# Patient Record
Sex: MALE | Race: WHITE | Employment: STUDENT | URBAN - METROPOLITAN AREA
[De-identification: names, ages, dates, MRNs, and addresses within clinical notes are randomized per-mention and may not be internally consistent; named-entity substitution may affect disease eponyms.]

---

## 2020-01-07 ENCOUNTER — OFFICE VISIT (OUTPATIENT)
Dept: NEUROLOGY | Age: 20
End: 2020-01-07

## 2020-01-07 VITALS
BODY MASS INDEX: 22.86 KG/M2 | HEART RATE: 87 BPM | RESPIRATION RATE: 20 BRPM | HEIGHT: 72 IN | OXYGEN SATURATION: 98 % | DIASTOLIC BLOOD PRESSURE: 88 MMHG | SYSTOLIC BLOOD PRESSURE: 138 MMHG | WEIGHT: 168.8 LBS

## 2020-01-07 DIAGNOSIS — R53.83 FATIGUE, UNSPECIFIED TYPE: ICD-10-CM

## 2020-01-07 DIAGNOSIS — R41.3 MEMORY LOSS: Primary | ICD-10-CM

## 2020-01-07 DIAGNOSIS — G47.12 IDIOPATHIC HYPERSOMNIA WITHOUT LONG SLEEP TIME: ICD-10-CM

## 2020-01-07 DIAGNOSIS — G44.229 CHRONIC TENSION-TYPE HEADACHE, NOT INTRACTABLE: ICD-10-CM

## 2020-01-07 DIAGNOSIS — M54.2 CERVICALGIA: ICD-10-CM

## 2020-01-07 RX ORDER — AMITRIPTYLINE HYDROCHLORIDE 10 MG/1
TABLET, FILM COATED ORAL
Qty: 90 TAB | Refills: 2 | Status: SHIPPED | OUTPATIENT
Start: 2020-01-07 | End: 2020-01-30

## 2020-01-07 NOTE — PROGRESS NOTES
NEUROLOGY CLINIC NOTE    Patient ID:  Samantha Graff  <[id number]>  18 y.o.  2000    Date of Consultation:  January 7, 2020    Referring Physician: Dr. Olson    Reason for Consultation:  Chronic fatigue and cognitive issues    Chief Complaint   Patient presents with    New Patient     chronic fatigue, memory issues, trouble thinking clearly        History of Present Illness: There are no active problems to display for this patient. No past medical history on file. No past surgical history on file. Prior to Admission medications    Not on File     NKDA    Social History     Tobacco Use    Smoking status: Never Smoker    Smokeless tobacco: Never Used   Substance Use Topics    Alcohol use: Not Currently    Drug use: Never     Family History   Problem Relation Age of Onset    Cancer Mother     Anxiety Mother     Depression Mother     Hypertension Father          Subjective:      Samantha Graff is a 23 y.o. RHWM with no significant medical history who was referred here by Dr. Olson for further evaluation of his issues. Condition has been ongoing for 1 year. Worse cognitively. Mainly issues with short term memory. Meeting people and the forgetting their names. Problems focusing. No issues with activities of daily living. Zoning out. Good and bad days. Problem with starting to sleep. Averages 8 to 9 hours. Wakes up not feeling rested. Eyes feels tired. No vivid dreams. Admits to daytime somnolence. Feels fatigued all the time. No weakness or loss of muscle bulk. Does not work out. Use to play baseball in high school. None since in college. Complains of neck tension and headaches. 4/10. Constant tension pain. Left occiput radiates up front. Daily. Currently sees a chiropractor for the past week. Occasional Advil offers no benefit. Worse with activity. Some better with rest.    He saw Dr. Joshua Bell 3 weeks PTC and was referred here.      Obtain records from PCP: Laboratory work-up done 9/27/2019 revealed normal vitamin D and negative chlamydia and Neisseria. Negative urinalysis. Laboratory work-up done 6/8/2019 were unremarkable (NILA, rheumatoid arthritis factor, vitamin D, B12, CRP, lipid panel, CMP, hemoglobin A1c and TSH). Outside reports reviewed: office notes, lab reports. Review of Systems:    A comprehensive review of systems was performed:   Constitutional: positive for fatigue  Eyes: positive for visual disturbance   Ears, nose, mouth, throat, and face: positive for hearing loss, ringing in the ears   Respiratory: positive for shortness of breath   Cardiovascular: positive for none  Gastrointestinal: positive for nausea   Genitourinary: positive for none  Integument/breast: positive for none  Hematologic/lymphatic: positive for none  Musculoskeletal: positive for neck pain, weakness  Neurological: positive for headaches, memory loss, dizziness  Behavioral/Psych: positive for none  Endocrine: positive for none  Allergic/Immunologic: positive for none      Objective:     Visit Vitals  Resp 20   Ht 6' (1.829 m)   Wt 76.6 kg (168 lb 12.8 oz)   BMI 22.89 kg/m²       PHYSICAL EXAM:    General Appearance: Alert, patient appears stated age. General:  Well developed, well nourished, patient in no apparent distress. Head/Face: The head is normocephalic and atraumatic. Eyes: Conjunctivae appear normal. Sclera appear normal and non-icteric. Nose (and Sinus):   No abnormality of the nose or sinuses is noted. Oral:   Throat is clear. Lymphatics:  No lymphadenopathy in the neck/head. Neck and Thyroid:   No bruits noted in the neck. Respiratory:  Lungs clear to auscultation. Cardiovascular:  Palpation and auscultation: regular rate and rhythm. Extremity: No joint swelling, erythema or pedal edema. NEUROLOGICAL EXAM:    Appearance: The patient is well developed, well nourished, provides a coherent history and is in no acute distress.    Mental Status: Oriented to time, place and person. Fluent, no aphasia or dysarthria. Mood and affect appropriate. MMSE 28/30 (problem with exact date, 1/3 on immediate recall) but improved with prodding. Cranial Nerves:   Intact visual fields. VA 20/20 OU on near vision without correction. Fundi are benign. CALEB, EOM's full, no nystagmus, no ptosis. Facial sensation is normal. Corneal reflexes are intact. Facial movement is symmetric. Hearing is normal bilaterally. Palate is midline with normal elevation. Sternocleidomastoid and trapezius muscles are normal. Tongue is midline. Motor:  5/5 strength in upper and lower proximal and distal muscles. Normal bulk and tone. No fasciculations. No pronator drift. Reflexes:   Deep tendon reflexes 2+/4 and symmetrical. Downgoing toes. Sensory:   Normal to cold, pinprick and vibration. Gait:  Normal gait. No Romberg. Can do tandem walking. Tremor:   No tremor noted. Cerebellar:  Intact FTN/ERIKA/HTS. Neurovascular:  Normal heart sounds and regular rhythm, peripheral pulses intact, and no carotid bruits. Anterior head posture. 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  No indication currently to do any neuroimaging. Cognitive testing was done and patient scored 28/30. Problems with exact date and immediate recall. However with prodding it did show improvement. No indication on bedside testing of any serious issues with progressive dementia. Suspect issue with cognition likely a symptom of poor sleep and headaches. Advised to do reminder systems. Advised to do brain age games. Monitor for now. If condition progresses then formal neuropsychological testing will be done. Negative laboratory work-up for reversible causes of memory issues. Patient having issues with sleeping.   Wakes up not feeling rested and has daytime somnolence. Trial of improving his sleep patterns and see if that improves his overall condition as well. Condition persist then patient will be referred to sleep specialist for polysomnogram.    Exam and history reveals elements of tension headaches due to poor anterior head posture. Advised to correct his head posture. Okay to take OTC medication for relief. Trial of amitriptyline 10 mg at bedtime initially and up to 30 mg at bedtime if necessary. Prescriptions provided. Patient with anterior head posture that predisposes to cervicogenic headaches as well as occipital neuralgia. Advised to correct his anterior head posture. Use headrest at home, while driving and at school. Use wireless headsets. Do not carry anything on the shoulder. Sleep with only one pillow at most.  Given brochure for neck and shoulder exercises to do. Continue chiropractic manipulation. May benefit from physical therapy with dry needling if condition worsens. Potential occipital nerve blocks. Exam reveals mild fatigable ptosis after 2-minute extreme upgaze. No limb fatigability. Given issues with chronic fatigue and overall sense of unwellness, need to check for neuromuscular junction disorder. Acetylcholine receptor antibody testing was ordered. Further intervention be done pending results of testing. All questions and concerns were answered. This note was created using voice recognition software. Despite editing, there may be syntax errors.

## 2020-01-07 NOTE — PROGRESS NOTES
Fatigue- over a year mentally tired and constantly zoning out, no clear train of thought, has gotten worse over the course of the year     Brain is very tired, takes a lot of energy to get anything done, forgets the simplest things     Falling asleep but not getting a good nights sleep, sometimes he is awake all night just not able to go back to sleep     Very tired during the day   Having pains coming from the left side of his head/neck and wraps to the front of his head     Did blood work with his PCP and everything came back normal

## 2020-01-07 NOTE — PATIENT INSTRUCTIONS
Neck: Exercises  Introduction  Here are some examples of exercises for you to try. The exercises may be suggested for a condition or for rehabilitation. Start each exercise slowly. Ease off the exercises if you start to have pain. You will be told when to start these exercises and which ones will work best for you. How to do the exercises  Neck stretch    1. This stretch works best if you keep your shoulder down as you lean away from it. To help you remember to do this, start by relaxing your shoulders and lightly holding on to your thighs or your chair. 2. Tilt your head toward your shoulder and hold for 15 to 30 seconds. Let the weight of your head stretch your muscles. 3. If you would like a little added stretch, use your hand to gently and steadily pull your head toward your shoulder. For example, keeping your right shoulder down, lean your head to the left. 4. Repeat 2 to 4 times toward each shoulder. Diagonal neck stretch    1. Turn your head slightly toward the direction you will be stretching, and tilt your head diagonally toward your chest and hold for 15 to 30 seconds. 2. If you would like a little added stretch, use your hand to gently and steadily pull your head forward on the diagonal.  3. Repeat 2 to 4 times toward each side. Dorsal glide stretch    1. Sit or stand tall and look straight ahead. 2. Slowly tuck your chin as you glide your head backward over your body  3. Hold for a count of 6, and then relax for up to 10 seconds. 4. Repeat 8 to 12 times. Chest and shoulder stretch    1. Sit or stand tall and glide your head backward as in the dorsal glide stretch. 2. Raise both arms so that your hands are next to your ears. 3. Take a deep breath, and as you breathe out, lower your elbows down and behind your back. You will feel your shoulder blades slide down and together, and at the same time you will feel a stretch across your chest and the front of your shoulders.   4. Hold for about 6 seconds, and then relax for up to 10 seconds. 5. Repeat 8 to 12 times. Strengthening: Hands on head    1. Move your head backward, forward, and side to side against gentle pressure from your hands, holding each position for about 6 seconds. 2. Repeat 8 to 12 times. Follow-up care is a key part of your treatment and safety. Be sure to make and go to all appointments, and call your doctor if you are having problems. It's also a good idea to know your test results and keep a list of the medicines you take. Where can you learn more? Go to http://fabricio-myrtle.info/. Enter P975 in the search box to learn more about \"Neck: Exercises. \"  Current as of: June 26, 2019  Content Version: 12.2  © 4043-0793 [employer]. Care instructions adapted under license by [employer] (which disclaims liability or warranty for this information). If you have questions about a medical condition or this instruction, always ask your healthcare professional. [address] any warranty or liability for your use of this information. Shoulder Blade: Exercises  Introduction  Here are some examples of exercises for you to try. The exercises may be suggested for a condition or for rehabilitation. Start each exercise slowly. Ease off the exercises if you start to have pain. You will be told when to start these exercises and which ones will work best for you. How to do the exercises  Shoulder roll    1. Stand tall with your chin slightly tucked. Imagine that a string at the top of your head is pulling you straight up. 2. Keep your arms relaxed. All motion will be in your shoulders. 3. Shrug your shoulders up toward your ears, then up and back. [place] your shoulders down and back, like you're sliding your hands down into your back pants pockets. 4. Repeat the circles at least 2 to 4 times. 5. This exercise is also helpful anytime you want to relax.     Lower neck and upper back stretch    1. With your arms about shoulder height, clasp your hands in front of you. 2. Drop your chin toward your chest.  3. Reach straight forward so you are rounding your upper back. Think about pulling your shoulder blades apart. Tamanna Mcdermott feel a stretch across your upper back and shoulders. Hold for at least 6 seconds. 4. Repeat 2 to 4 times. Triceps stretch    1. Reach your arm straight up. 2. Keeping your elbow in place, bend your arm and reach your hand down behind your back. 3. With your other hand, apply gentle pressure to the bent elbow. Tamanna Mcdermott feel a stretch at the back of your upper arm and shoulder. Hold about 6 seconds. 4. Repeat 2 to 4 times with each arm. Shoulder stretch    1. Relax your shoulders. 2. Raise one arm to shoulder height, and reach it across your chest.  3. Pull the arm slightly toward you with your other arm. This will help you get a gentle stretch. Hold for about 6 seconds. 4. Repeat 2 to 4 times. Shoulder blade squeeze    1. Sit or stand up tall with your arms at your sides. 2. Keep your shoulders relaxed and down, not shrugged. 3. Squeeze your shoulder blades together. Hold for 6 seconds, then relax. 4. Repeat 8 to 12 times. Straight-arm shoulder blade squeeze    1. Sit or stand tall. Relax your shoulders. 2. With palms down, hold your elastic tubing or band straight out in front of you. 3. Start with slight tension in the tubing or band, with your hands about shoulder-width apart. 4. Slowly pull straight out to the sides, squeezing your shoulder blades together. Keep your arms straight and at shoulder height. Slowly release. 5. Repeat 8 to 12 times. Rowing    1. [place] your elastic tubing or band at about waist height. Take one end in each hand. 2. Sit or stand with your feet hip-width apart. 3. Hold your arms straight in front of you. Adjust your distance to create slight tension in the tubing or band. 4. Slightly tuck your chin. Relax your shoulders. 5. Without shrugging your shoulders, pull straight back. Your elbows will pass alongside your waist.    Pull-downs    1. [place] your elastic tubing or band in the top of a closed door. Take one end in each hand. 2. Either sit or stand, depending on what is more comfortable. If you feel unsteady, sit on a chair. 3. Start with your arms up and comfortably apart, elbows straight. There should be a slight tension in the tubing or band. 4. Slightly tuck your chin, and look straight ahead. 5. Keeping your back straight, slowly pull down and back, bending your elbows. 6. Stop where your hands are level with your chin, in a \"goalpost\" position. 7. Repeat 8 to 12 times. Chest T stretch    1. Lie on your back. Raise your knees so they are bent. Plant your feet on the floor, hip-width apart. 2. Tuck your chin, and relax your shoulders. 3. Reach your arms straight out to the sides. If you don't feel a mild stretch in your shoulders and across your chest, use a foam roll or a tightly rolled blanket under your spine, from your tailbone to your head. 4. Relax in this position for at least 15 to 30 seconds while you breathe normally. Repeat 2 to 4 times. 5. As you get used to this stretch, keep adding a little more time until you are able relax in this position for 2 or 3 minutes. When you can relax for at least 2 minutes, you only need to do the exercise 1 time per session. Chest goalpost stretch    1. Lie on your back. Raise your knees so they are bent. Plant your feet on the floor, hip-width apart. 2. Tuck your chin, and relax your shoulders. 3. Reach your arms straight out to the sides. 4. Bend your arms at the elbows, with your hands pointed toward the top of your head. Your arms should make an L on either side of your head. Your palms should be facing up.   5. If you don't feel a mild stretch in your shoulders and across your chest, use a foam roll or tightly rolled blanket under your spine, from your tailbone to your head. 6. Relax in this position for at least 15 to 30 seconds while you breathe normally. Repeat 2 to 4 times. 7. Each day you do this exercise, add a little more time until you can relax in this position for 2 or 3 minutes. When you can relax for at least 2 minutes, you only need to do the exercise 1 time per session. Follow-up care is a key part of your treatment and safety. Be sure to make and go to all appointments, and call your doctor if you are having problems. It's also a good idea to know your test results and keep a list of the medicines you take. Where can you learn more? Go to http://fabricio-myrtle.info/. Enter ([phone] [phone] in the search box to learn more about \"Shoulder Blade: Exercises. \"  Current as of: June 26, 2019  Content Version: 12.2  © 9723-7412 [employer]. Care instructions adapted under license by [employer] (which disclaims liability or warranty for this information). If you have questions about a medical condition or this instruction, always ask your healthcare professional. [place] [address] any warranty or liability for your use of this information. Headache: Care Instructions  Your Care Instructions    Headaches have many possible causes. Most headaches aren't a sign of a more serious problem, and they will get better on their own. Home treatment may help you feel better faster. The doctor has checked you carefully, but problems can develop later. If you notice any problems or new symptoms, get medical treatment right away. Follow-up care is a key part of your treatment and safety. Be sure to make and go to all appointments, and call your doctor if you are having problems. It's also a good idea to know your test results and keep a list of the medicines you take. How can you care for yourself at home? · Do not drive if you have taken a prescription pain medicine.   · Rest in a quiet, dark room until your headache is gone. Close your eyes and try to relax or go to sleep. Don't watch TV or read. · Put a cold, moist cloth or cold pack on the painful area for 10 to 20 minutes at a time. Put a thin cloth between the cold pack and your skin. · Use a warm, moist towel or a heating pad set on low to relax tight shoulder and neck muscles. · Have someone gently massage your neck and shoulders. · Take pain medicines exactly as directed. ? If the doctor gave you a prescription medicine for pain, take it as prescribed. ? If you are not taking a prescription pain medicine, ask your doctor if you can take an over-the-counter medicine. · Be careful not to take pain medicine more often than the instructions allow, because you may get worse or more frequent headaches when the medicine wears off. · Do not ignore new symptoms that occur with a headache, such as a fever, weakness or numbness, vision changes, or confusion. These may be signs of a more serious problem. To prevent headaches  · Keep a headache diary so you can figure out what triggers your headaches. Avoiding triggers may help you prevent headaches. Record when each headache began, how long it lasted, and what the pain was like (throbbing, aching, stabbing, or dull). Write down any other symptoms you had with the headache, such as nausea, flashing lights or dark spots, or sensitivity to bright light or loud noise. Note if the headache occurred near your period. List anything that might have triggered the headache, such as certain foods (chocolate, cheese, wine) or odors, smoke, bright light, stress, or lack of sleep. · Find healthy ways to deal with stress. Headaches are most common during or right after stressful times. Take time to relax before and after you do something that has caused a headache in the past.  · Try to keep your muscles relaxed by keeping good posture.  Check your jaw, face, neck, and shoulder muscles for tension, and try relaxing them. When sitting at a desk, change positions often, and stretch for 30 seconds each hour. · Get plenty of sleep and exercise. · Eat regularly and well. Long periods without food can trigger a headache. · Treat yourself to a massage. Some people find that regular massages are very helpful in relieving tension. · Limit caffeine by not drinking too much coffee, tea, or soda. But don't quit caffeine suddenly, because that can also give you headaches. · Reduce eyestrain from computers by blinking frequently and looking away from the computer screen every so often. Make sure you have proper eyewear and that your monitor is set up properly, about an arm's length away. · Seek help if you have depression or anxiety. Your headaches may be linked to these conditions. Treatment can both prevent headaches and help with symptoms of anxiety or depression. When should you call for help? Call 911 anytime you think you may need emergency care. For example, call if:    · You have signs of a stroke. These may include:  ? Sudden numbness, paralysis, or weakness in your face, arm, or leg, especially on only one side of your body. ? Sudden vision changes. ? Sudden trouble speaking. ? Sudden confusion or trouble understanding simple statements. ? Sudden problems with walking or balance. ? A sudden, severe headache that is different from past headaches.    Call your doctor now or seek immediate medical care if:    · You have a new or worse headache.     · Your headache gets much worse. Where can you learn more? Go to http://fabricio-myrtle.info/. Enter M271 in the search box to learn more about \"Headache: Care Instructions. \"  Current as of: March 28, 2019  Content Version: 12.2  © 7948-2200 [employer]. Care instructions adapted under license by [employer] (which disclaims liability or warranty for this information).  If you have questions about a medical condition or this instruction, always ask your healthcare professional. [place] [address] any warranty or liability for your use of this information.

## 2020-01-28 LAB
ACHR BIND AB SER-SCNC: 0.06 NMOL/L (ref 0–0.24)
ACHR BLOCK AB SER-ACNC: 11 % (ref 0–25)
ACHR MOD AB/ACHR TOTAL SFR SER: <12 % (ref 0–20)
MUSK AB SER-SCNC: <1 U/ML
REFLEX INFORMATION: NORMAL

## 2020-01-30 DIAGNOSIS — G44.229 CHRONIC TENSION-TYPE HEADACHE, NOT INTRACTABLE: ICD-10-CM

## 2020-01-30 DIAGNOSIS — M54.2 CERVICALGIA: ICD-10-CM

## 2020-01-30 RX ORDER — AMITRIPTYLINE HYDROCHLORIDE 25 MG/1
TABLET, FILM COATED ORAL
Qty: 60 TAB | Refills: 1 | Status: SHIPPED | OUTPATIENT
Start: 2020-01-30

## 2020-01-30 NOTE — PROGRESS NOTES
Spoke to patient that his myasthenia panel testing including MUSK antibody were negative. Adjust Amitriptyline dosing up to 50 mg at bedtime in necessary. Prescription was sent to his pharmacy.

## 2020-03-12 ENCOUNTER — OFFICE VISIT (OUTPATIENT)
Dept: NEUROLOGY | Age: 20
End: 2020-03-12

## 2020-03-12 VITALS
OXYGEN SATURATION: 99 % | HEIGHT: 72 IN | DIASTOLIC BLOOD PRESSURE: 82 MMHG | WEIGHT: 164 LBS | SYSTOLIC BLOOD PRESSURE: 150 MMHG | RESPIRATION RATE: 20 BRPM | BODY MASS INDEX: 22.21 KG/M2 | HEART RATE: 103 BPM | TEMPERATURE: 97.7 F

## 2020-03-12 DIAGNOSIS — R41.3 MEMORY LOSS: Primary | ICD-10-CM

## 2020-03-12 DIAGNOSIS — R43.9 SMELL DISORDER: ICD-10-CM

## 2020-03-12 DIAGNOSIS — M54.2 CERVICALGIA: ICD-10-CM

## 2020-03-12 DIAGNOSIS — G44.229 CHRONIC TENSION-TYPE HEADACHE, NOT INTRACTABLE: ICD-10-CM

## 2020-03-12 DIAGNOSIS — H43.393 FLOATERS IN VISUAL FIELD, BILATERAL: ICD-10-CM

## 2020-03-12 DIAGNOSIS — R53.83 FATIGUE, UNSPECIFIED TYPE: ICD-10-CM

## 2020-03-12 DIAGNOSIS — G47.12 IDIOPATHIC HYPERSOMNIA WITHOUT LONG SLEEP TIME: ICD-10-CM

## 2020-03-12 NOTE — PATIENT INSTRUCTIONS
Fatigue: Care Instructions  Your Care Instructions    Fatigue is a feeling of tiredness, exhaustion, or lack of energy. You may feel fatigue because of too much or not enough activity. It can also come from stress, lack of sleep, boredom, and poor diet. Many medical problems, such as viral infections, can cause fatigue. Emotional problems, especially depression, are often the cause of fatigue. Fatigue is most often a symptom of another problem. Treatment for fatigue depends on the cause. For example, if you have fatigue because you have a certain health problem, treating this problem also treats your fatigue. If depression or anxiety is the cause, treatment may help. Follow-up care is a key part of your treatment and safety. Be sure to make and go to all appointments, and call your doctor if you are having problems. It's also a good idea to know your test results and keep a list of the medicines you take. How can you care for yourself at home? · Get regular exercise. But don't overdo it. Go back and forth between rest and exercise. · Get plenty of rest.  · Eat a healthy diet. Do not skip meals, especially breakfast.  · Reduce your use of caffeine, tobacco, and alcohol. Caffeine is most often found in coffee, tea, cola drinks, and chocolate. · Limit medicines that can cause fatigue. This includes tranquilizers and cold and allergy medicines. When should you call for help? Watch closely for changes in your health, and be sure to contact your doctor if:    · You have new symptoms such as fever or a rash.     · Your fatigue gets worse.     · You have been feeling down, depressed, or hopeless. Or you may have lost interest in things that you usually enjoy.     · You are not getting better as expected. Where can you learn more? Go to http://fabricio-myrtle.info/. Enter I513 in the search box to learn more about \"Fatigue: Care Instructions. \"  Current as of: June 26, 2019  Content Version: 12.2  © 2406-6589 [facility]. Care instructions adapted under license by [employer] (which disclaims liability or warranty for this information). If you have questions about a medical condition or this instruction, always ask your healthcare professional. [address] any warranty or liability for your use of this information. Neck Pain: Care Instructions  Your Care Instructions    You can have neck pain anywhere from the bottom of your head to the top of your shoulders. It can spread to the upper back or arms. Injuries, painting a ceiling, sleeping with your neck twisted, staying in one position for too long, and many other activities can cause neck pain. Most neck pain gets better with home care. Your doctor may recommend medicine to relieve pain or relax your muscles. He or she may suggest exercise and physical therapy to increase flexibility and relieve stress. You may need to wear a special (cervical) collar to support your neck for a day or two. Follow-up care is a key part of your treatment and safety. Be sure to make and go to all appointments, and call your doctor if you are having problems. It's also a good idea to know your test results and keep a list of the medicines you take. How can you care for yourself at home? · Try using a heating pad on a low or medium setting for 15 to 20 minutes every 2 or 3 hours. Try a warm shower in place of one session with the heating pad. · You can also try an ice pack for 10 to 15 minutes every 2 to 3 hours. Put a thin cloth between the ice and your skin. · Take pain medicines exactly as directed. ? If the doctor gave you a prescription medicine for pain, take it as prescribed. ? If you are not taking a prescription pain medicine, ask your doctor if you can take an over-the-counter medicine. · If your doctor recommends a cervical collar, wear it exactly as directed. When should you call for help?   Call your doctor now or seek immediate medical care if:    · You have new or worsening numbness in your arms, buttocks or legs.     · You have new or worsening weakness in your arms or legs. (This could make it hard to stand up.)     · You lose control of your bladder or bowels.    Watch closely for changes in your health, and be sure to contact your doctor if:    · Your neck pain is getting worse.     · You are not getting better after 1 week.     · You do not get better as expected. Where can you learn more? Go to http://fabricio-myrtle.info/. Enter [id number] in the search box to learn more about \"Neck Pain: Care Instructions. \"  Current as of: June 26, 2019  Content Version: 12.2  © 7780-4356 [employer]. Care instructions adapted under license by [employer] (which disclaims liability or warranty for this information). If you have questions about a medical condition or this instruction, always ask your healthcare professional. [place] [address] any warranty or liability for your use of this information. Floaters and Flashes: Care Instructions  Your Care Instructions    Floaters are spots and lines that \"float\" across your field of vision. They are caused by stray cells or strands of tissue inside the eyeball. Flashes are sparkles or lightning streaks. These occur in your side vision. This is called the peripheral vision. Floaters and flashes usually aren't serious. In many cases, they're a normal part of getting older. Some people get used to them. Others find them annoying. If floaters bother you, you can try to look up and then down. This may make them go away. For now, your doctor doesn't think your symptoms are a sign of a more serious problem. But an eye exam is the only way to know for sure. Follow-up care is a key part of your treatment and safety. Be sure to make and go to all appointments, and call your doctor if you are having problems.  It's also a good idea to know your test results and keep a list of the medicines you take. When should you call for help? Call your doctor now or seek immediate medical care if:    · You have vision changes.    Watch closely for changes in your health, and be sure to contact your doctor if:    · You see new floaters.     · You see new flashes of light.     · You do not get better as expected. Where can you learn more? Go to http://fabricio-myrtle.info/. Enter P983 in the search box to learn more about \"Floaters and Flashes: Care Instructions. \"  Current as of: May 5, 2019  Content Version: 12.2  © 6859-9424 [employer]. Care instructions adapted under license by [employer] (which disclaims liability or warranty for this information). If you have questions about a medical condition or this instruction, always ask your healthcare professional. [place] [address] any warranty or liability for your use of this information. Headache: Care Instructions  Your Care Instructions    Headaches have many possible causes. Most headaches aren't a sign of a more serious problem, and they will get better on their own. Home treatment may help you feel better faster. The doctor has checked you carefully, but problems can develop later. If you notice any problems or new symptoms, get medical treatment right away. Follow-up care is a key part of your treatment and safety. Be sure to make and go to all appointments, and call your doctor if you are having problems. It's also a good idea to know your test results and keep a list of the medicines you take. How can you care for yourself at home? · Do not drive if you have taken a prescription pain medicine. · Rest in a quiet, dark room until your headache is gone. Close your eyes and try to relax or go to sleep. Don't watch TV or read. · Put a cold, moist cloth or cold pack on the painful area for 10 to 20 minutes at a time. Put a thin cloth between the cold pack and your skin. · Use a warm, moist towel or a heating pad set on low to relax tight shoulder and neck muscles. · Have someone gently massage your neck and shoulders. · Take pain medicines exactly as directed. ? If the doctor gave you a prescription medicine for pain, take it as prescribed. ? If you are not taking a prescription pain medicine, ask your doctor if you can take an over-the-counter medicine. · Be careful not to take pain medicine more often than the instructions allow, because you may get worse or more frequent headaches when the medicine wears off. · Do not ignore new symptoms that occur with a headache, such as a fever, weakness or numbness, vision changes, or confusion. These may be signs of a more serious problem. To prevent headaches  · Keep a headache diary so you can figure out what triggers your headaches. Avoiding triggers may help you prevent headaches. Record when each headache began, how long it lasted, and what the pain was like (throbbing, aching, stabbing, or dull). Write down any other symptoms you had with the headache, such as nausea, flashing lights or dark spots, or sensitivity to bright light or loud noise. Note if the headache occurred near your period. List anything that might have triggered the headache, such as certain foods (chocolate, cheese, wine) or odors, smoke, bright light, stress, or lack of sleep. · Find healthy ways to deal with stress. Headaches are most common during or right after stressful times. Take time to relax before and after you do something that has caused a headache in the past.  · Try to keep your muscles relaxed by keeping good posture. Check your jaw, face, neck, and shoulder muscles for tension, and try relaxing them. When sitting at a desk, change positions often, and stretch for 30 seconds each hour. · Get plenty of sleep and exercise. · Eat regularly and well.  Long periods without food can trigger a headache. · Treat yourself to a massage. Some people find that regular massages are very helpful in relieving tension. · Limit caffeine by not drinking too much coffee, tea, or soda. But don't quit caffeine suddenly, because that can also give you headaches. · Reduce eyestrain from computers by blinking frequently and looking away from the computer screen every so often. Make sure you have proper eyewear and that your monitor is set up properly, about an arm's length away. · Seek help if you have depression or anxiety. Your headaches may be linked to these conditions. Treatment can both prevent headaches and help with symptoms of anxiety or depression. When should you call for help? Call 911 anytime you think you may need emergency care. For example, call if:    · You have signs of a stroke. These may include:  ? Sudden numbness, paralysis, or weakness in your face, arm, or leg, especially on only one side of your body. ? Sudden vision changes. ? Sudden trouble speaking. ? Sudden confusion or trouble understanding simple statements. ? Sudden problems with walking or balance. ? A sudden, severe headache that is different from past headaches.    Call your doctor now or seek immediate medical care if:    · You have a new or worse headache.     · Your headache gets much worse. Where can you learn more? Go to http://fabricio-myrtle.info/. Enter M271 in the search box to learn more about \"Headache: Care Instructions. \"  Current as of: March 28, 2019  Content Version: 12.2  © 0261-5472 [employer]. Care instructions adapted under license by [employer] (which disclaims liability or warranty for this information). If you have questions about a medical condition or this instruction, always ask your healthcare professional. [place] [address] any warranty or liability for your use of this information.

## 2020-03-12 NOTE — LETTER
3/12/20 Patient: Tabitha Ramos YOB: 2000 Date of Visit: 3/12/2020 Emiliano Dumont MD 
[address] [facility] [address] [place] [address] 99 [id number] VIA Facsimile: [phone] Dear Emiliano Dumont MD, Thank you for referring Mr. Tabitha Ramos to [address] [place] [address] [address] [facility] for evaluation. My notes for this consultation are attached. If you have questions, please do not hesitate to call me. I look forward to following your patient along with you. Sincerely, Joanne Chavez MD

## 2020-03-12 NOTE — PROGRESS NOTES
Has been the same, hasn't noticed any difference other than the 2 pills at bedtime makes it really hard for him to get out of bed in the morning

## 2020-03-12 NOTE — PROGRESS NOTES
NEUROLOGY CLINIC NOTE    Patient ID:  Efrem Benitez  <[id number]>  61 y.o.  2000    Date of Visit:  March 12, 2020    Referring Physician: Dr. Pineda Hawk    Reason for Visit:  Chronic fatigue and cognitive issues    Chief Complaint   Patient presents with    Follow-up     being tired and over thinking        History of Present Illness: There are no active problems to display for this patient. No past medical history on file. No past surgical history on file. Prior to Admission medications    Medication Sig Start Date End Date Taking? Authorizing Provider   amitriptyline (ELAVIL) 25 mg tablet Take 1 1/2 at bedtime x 1 wk; then 2 at bedtime 1/30/20  Yes Venessa Brandt MD     NKDA    Social History     Tobacco Use    Smoking status: Never Smoker    Smokeless tobacco: Never Used   Substance Use Topics    Alcohol use: Not Currently    Drug use: Never     Family History   Problem Relation Age of Onset    Cancer Mother     Anxiety Mother     Depression Mother     Hypertension Father          Subjective:      Efrem Benitez is a 21 y.o. RHWM with no significant medical history who is here for follow up of multiple complaints. Patient was referred here by Dr. Pineda Hawk for further evaluation of his issues. Condition has been ongoing for 1 year. Worse cognitively. Mainly issues with short term memory. Meeting people and the forgetting their names. Problems focusing. No issues with activities of daily living. Zoning out. Good and bad days. Problem with starting to sleep. Averages 8 to 9 hours. Wakes up not feeling rested. Eyes feels tired. No vivid dreams. Admits to daytime somnolence. Feels fatigued all the time. No weakness or loss of muscle bulk. Does not work out. Use to play baseball in high school. None since in college. Complains of neck tension and headaches. 4/10. Constant tension pain. Left occiput radiates up front. Daily.  Currently sees a chiropractor for the past week. Occasional Advil offers no benefit. Worse with activity. Some better with rest.    He saw Dr. Liudmila Cook 3 weeks PTC and was referred here. Obtain records from PCP: Laboratory work-up done 9/27/2019 revealed normal vitamin D and negative chlamydia and Neisseria. Negative urinalysis. Laboratory work-up done 6/8/2019 were unremarkable (NILA, rheumatoid arthritis factor, vitamin D, B12, CRP, lipid panel, CMP, hemoglobin A1c and TSH). Since the last visit on 1/07/2020, acetylcholine receptor and MUSK antibody testing was negative. Patient was advised to increase his Amitriptyline. Patient is now taking 50 mg at bedtime. No benefit. Causes difficulty waking up the next day. Continues to struggle cognitively. Mentions issues with seeing floaters in both eyes. Seen his optometrist who did not find any pathology. He also mentions problems with his sense of smell. Still with fatigue. Pressure on the back of the head. His PCP did blood work and he mentions they found the his platelets were     Outside reports reviewed: none    Review of Systems:    A comprehensive review of systems was performed:   Constitutional: positive for fatigue  Eyes: positive for visual disturbance   Ears, nose, mouth, throat, and face: positive for hearing loss, ringing in the ears, changes in his sense of smell  Musculoskeletal: positive for neck pain, weakness  Neurological: positive for headaches, memory loss, dizziness    Objective:     Visit Vitals  /82   Pulse (!) 103   Temp 97.7 °F (36.5 °C) (Oral)   Resp 20   Ht 6' (1.829 m)   Wt 74.4 kg (164 lb)   SpO2 99%   BMI 22.24 kg/m²       PHYSICAL EXAM:      NEUROLOGICAL EXAM:    Appearance: The patient is well developed, well nourished, provides a coherent history and is in no acute distress. Mental Status: Oriented to time, place and person. Fluent, no aphasia or dysarthria. Mood and affect appropriate. Problems with recent memory. Cranial Nerves:   II - XII were intact. Motor:  5/5 strength. Normal bulk and tone. No pronator drift. Reflexes:   Deep tendon reflexes were symmetrical.    Sensory:   Normal to cold, pinprick and vibration. Gait:  Normal gait. No Romberg. Tremor:   No tremor noted. Cerebellar:  Intact FTN/ERIKA/HTS. Anterior head posture. 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 It is still nonfocal.  Given persistence of his cognitive issues plus emergence of other complaints, it is prudent to do neuroimaging to assess for a central pathology. Brain MRI without contrast was ordered to further assess. Previous cognitive testing score was 28/30. No indication on bedside testing of any serious issues with progressive dementia. Advised to do reminder systems. Advised to do brain age games. If brain MRI is negative, patient will be referred for formal neuropsychological testing. Negative laboratory work-up for reversible causes of memory issues. Issues with sense of smell. Unclear etiology. Brain MRI as above. Visual floaters with negative evaluation by optometry. Advised to see an ophthalmologist. [place]  MRI as above. Testing for neuromuscular junction disorder was negative. Patient reportedly was found to have thrombocytopenia. May need hematology evaluation if it persists. Brain MRI as above to assess for central demyelinating disorder. Patient having issues with sleeping. Wakes up not feeling rested and has daytime somnolence. No improvement with medication. Recommend referral to sleep specialist for polysomnogram.    Exam and history reveals elements of tension headaches due to poor anterior head posture. Advised to correct his head posture. Okay to take OTC medication for relief. Taper off amitriptyline.     Patient with anterior head posture that predisposes to cervicogenic headaches as well as occipital neuralgia. Encouraged to continue to correct his anterior head posture. Use headrest at home, while driving and at school. Use wireless headsets. Do not carry anything on the shoulder. Sleep with only one pillow at most. Continue neck and shoulder exercises. Continue chiropractic manipulation. May benefit from physical therapy with dry needling if condition worsens. Potential occipital nerve blocks. All questions and concerns were answered. This note was created using voice recognition software. Despite editing, there may be syntax errors.

## 2020-03-24 ENCOUNTER — HOSPITAL ENCOUNTER (OUTPATIENT)
Dept: MRI IMAGING | Age: 20
Discharge: HOME | End: 2020-03-24
Attending: PSYCHIATRY & NEUROLOGY
Payer: COMMERCIAL

## 2020-03-24 DIAGNOSIS — R43.9 SMELL DISORDER: ICD-10-CM

## 2020-03-24 DIAGNOSIS — R41.3 MEMORY LOSS: ICD-10-CM

## 2020-03-24 DIAGNOSIS — H43.393 FLOATERS IN VISUAL FIELD, BILATERAL: ICD-10-CM

## 2020-03-24 DIAGNOSIS — G47.12 IDIOPATHIC HYPERSOMNIA WITHOUT LONG SLEEP TIME: ICD-10-CM

## 2020-03-24 DIAGNOSIS — R53.83 FATIGUE, UNSPECIFIED TYPE: ICD-10-CM

## 2020-03-24 PROCEDURE — 70551 MRI BRAIN STEM W/O DYE: CPT

## 2023-05-23 RX ORDER — AMITRIPTYLINE HYDROCHLORIDE 25 MG/1
TABLET, FILM COATED ORAL
COMMUNITY
Start: 2020-01-30